# Patient Record
Sex: MALE | Race: BLACK OR AFRICAN AMERICAN | Employment: FULL TIME | URBAN - METROPOLITAN AREA
[De-identification: names, ages, dates, MRNs, and addresses within clinical notes are randomized per-mention and may not be internally consistent; named-entity substitution may affect disease eponyms.]

---

## 2024-10-10 ENCOUNTER — OFFICE VISIT (OUTPATIENT)
Age: 54
End: 2024-10-10

## 2024-10-10 VITALS
OXYGEN SATURATION: 98 % | RESPIRATION RATE: 16 BRPM | WEIGHT: 170 LBS | DIASTOLIC BLOOD PRESSURE: 90 MMHG | SYSTOLIC BLOOD PRESSURE: 160 MMHG | TEMPERATURE: 98.7 F | HEART RATE: 76 BPM

## 2024-10-10 DIAGNOSIS — Z20.2 POSSIBLE EXPOSURE TO STD: Primary | ICD-10-CM

## 2024-10-10 PROBLEM — R13.12 OROPHARYNGEAL DYSPHAGIA: Status: ACTIVE | Noted: 2022-11-29

## 2024-10-10 PROBLEM — F12.10 MILD TETRAHYDROCANNABINOL (THC) ABUSE: Status: ACTIVE | Noted: 2023-03-02

## 2024-10-10 NOTE — PROGRESS NOTES
[facility]    John Huerta (:  1970 MRN: [id number]) is a 54 y.o. male, here for evaluation of the following chief complaint(s):  Sexually Transmitted Diseases    ASSESSMENT/PLAN:    ICD-10-CM    1. Possible exposure to STD  Z20.2 Trichomonas by EIA     C.trachomatis N.gonorrhoeae DNA, Urine ([place] Only)          New Prescriptions    No medications on file     Summary    - He declined referral to PCP.   - I had a discussion about the patient's blood pressure and informed him of the risks of having elevated blood pressure and not getting treated for such.  I recommended that he follow up with an internist as soon as possible.  - Follow up discussed and will be on an as-needed basis.  Differentials: pyelonephritis, Cystitis, Urethritis, Chlamydia, Gonorrhea, Trichomoniasis, Candidal Infection, Bacterial Vaginosis, Pelvis inflammatory disease.    SUBJECTIVE/OBJECTIVE:  ERIBERTO Lopez presents to the clinic with concerns regarding a possible genitourinary infection. His female partner has symptoms and told him to get tested but he does not have any symptoms.     Vitals:    10/10/24 1657   BP: (!) 160/90   Pulse: 76   Resp: 16   Temp: 98.7 °F (37.1 °C)   TempSrc: Oral   SpO2: 98%   Weight: 77.1 kg (170 lb)       No results found for this visit on 10/10/24.     Review of Systems  PHYSICAL EXAM    Constitutional:  Well developed, well nourished, no acute distress, non-toxic appearance   Eyes:  PERRL, conjunctiva normal   HENT:  Atraumatic, external ears normal, nose normal, oropharynx moist. Neck supple   Respiratory:  No respiratory distress, normal breath sounds   Cardiovascular:  RRR, no murmurs, no gallops  GI:  No guarding, bowel sounds present x 4, abdomen is non-tender, no CVA tenderness.   : Patient declined exam   Musculoskeletal:  No edema   Neurologic:  AAO x 3, no focal deficits     An electronic signature was used to authenticate this note.    Se Lai, APRN - CNP

## 2024-10-11 LAB
C TRACH DNA UR QL NAA+PROBE: NEGATIVE
N GONORRHOEA DNA UR QL NAA+PROBE: NEGATIVE
SPECIMEN TYPE: NORMAL
TRICHOMONAS VAGINALIS SCREEN: NEGATIVE

## 2024-10-19 ENCOUNTER — OFFICE VISIT (OUTPATIENT)
Age: 54
End: 2024-10-19

## 2024-10-19 VITALS
TEMPERATURE: 98.4 F | SYSTOLIC BLOOD PRESSURE: 134 MMHG | OXYGEN SATURATION: 97 % | HEART RATE: 95 BPM | RESPIRATION RATE: 18 BRPM | DIASTOLIC BLOOD PRESSURE: 90 MMHG

## 2024-10-19 DIAGNOSIS — N48.89 PENILE PAIN: Primary | ICD-10-CM

## 2024-10-19 RX ORDER — METRONIDAZOLE 500 MG/1
2000 TABLET ORAL ONCE
Qty: 4 TABLET | Refills: 0 | Status: SHIPPED | OUTPATIENT
Start: 2024-10-19 | End: 2024-10-19

## 2024-10-19 NOTE — PROGRESS NOTES
[facility]    John Huerta (:  1970 MRN: [id number]) is a 54 y.o. male, here for evaluation of the following chief complaint(s):  Exposure to STD (Pt exposed to Trich)    ASSESSMENT/PLAN:    ICD-10-CM    1. Penile pain  N48.89           New Prescriptions    METRONIDAZOLE (FLAGYL) 500 MG TABLET    Take 4 tablets by mouth once for 1 dose     Summary  - The patient requested immediate treatment for the suspected disease(s).  - He declined testing.   - He is symptomatic.  - Follow up discussed and will be on an as-needed basis.    SUBJECTIVE/OBJECTIVE:  HPI  John presents to the clinic with concerns regarding a possible genitourinary infection. He recently tested negative for STDs but has since started having symptoms of penile discomfort and is asking for treatment for trichomoniasis since his partner tested positive.         Vitals:    10/19/24 1226   BP: (!) 134/90   Pulse: 95   Resp: 18   Temp: 98.4 °F (36.9 °C)   SpO2: 97%       No results found for this visit on 10/19/24.     Review of Systems  PHYSICAL EXAM    Constitutional:  Well developed, well nourished, no acute distress, non-toxic appearance   Eyes:  PERRL, conjunctiva normal   HENT:  Atraumatic, external ears normal, nose normal, oropharynx moist. Neck supple   Respiratory:  No respiratory distress, normal breath sounds   Cardiovascular:  RRR, no murmurs, no gallops  GI:  No guarding, bowel sounds present x 4, abdomen is non-tender, no CVA tenderness.   : He declined exam       An electronic signature was used to authenticate this note.    Se Lai, APRN - CNP